# Patient Record
Sex: FEMALE | Race: ASIAN | NOT HISPANIC OR LATINO
[De-identification: names, ages, dates, MRNs, and addresses within clinical notes are randomized per-mention and may not be internally consistent; named-entity substitution may affect disease eponyms.]

---

## 2018-02-23 ENCOUNTER — RX ONLY (RX ONLY)
Age: 53
End: 2018-02-23

## 2018-02-23 ENCOUNTER — DOCTOR'S OFFICE (OUTPATIENT)
Dept: URBAN - NONMETROPOLITAN AREA CLINIC 1 | Facility: CLINIC | Age: 53
Setting detail: OPHTHALMOLOGY
End: 2018-02-23
Payer: COMMERCIAL

## 2018-02-23 DIAGNOSIS — H16.223: ICD-10-CM

## 2018-02-23 DIAGNOSIS — H10.13: ICD-10-CM

## 2018-02-23 DIAGNOSIS — H40.033: ICD-10-CM

## 2018-02-23 DIAGNOSIS — H16.222: ICD-10-CM

## 2018-02-23 PROCEDURE — 83861 MICROFLUID ANALY TEARS: CPT | Performed by: OPHTHALMOLOGY

## 2018-02-23 PROCEDURE — 92132 CPTRZD OPH DX IMG ANT SGM: CPT | Performed by: OPHTHALMOLOGY

## 2018-02-23 PROCEDURE — 92002 INTRM OPH EXAM NEW PATIENT: CPT | Performed by: OPHTHALMOLOGY

## 2018-02-23 ASSESSMENT — REFRACTION_MANIFEST
OS_VA2: 20/
OS_VA3: 20/
OD_VA2: 20/
OS_VA3: 20/
OS_VA1: 20/
OD_VA1: 20/
OS_VA3: 20/
OS_VA1: 20/
OU_VA: 20/
OD_VA1: 20/
OD_VA3: 20/
OD_VA3: 20/
OS_VA1: 20/
OS_VA2: 20/
OU_VA: 20/
OD_VA1: 20/
OD_VA2: 20/
OS_VA2: 20/
OU_VA: 20/
OD_VA3: 20/
OD_VA2: 20/

## 2018-02-23 ASSESSMENT — DRY EYES - PHYSICIAN NOTES
OD_GENERALCOMMENTS: KSICCA
OS_GENERALCOMMENTS: KSICCA

## 2018-02-23 ASSESSMENT — REFRACTION_CURRENTRX
OD_OVR_VA: 20/
OS_OVR_VA: 20/
OD_OVR_VA: 20/
OS_OVR_VA: 20/
OS_OVR_VA: 20/
OD_OVR_VA: 20/

## 2018-02-23 ASSESSMENT — CONFRONTATIONAL VISUAL FIELD TEST (CVF)
OS_FINDINGS: FULL
OD_FINDINGS: FULL

## 2018-02-23 ASSESSMENT — REFRACTION_AUTOREFRACTION
OD_CYLINDER: -0.75
OS_AXIS: 160
OD_SPHERE: +1.00
OS_SPHERE: +1.25
OD_AXIS: 9
OS_CYLINDER: -1.75

## 2018-02-23 ASSESSMENT — SPHEQUIV_DERIVED
OS_SPHEQUIV: 0.375
OD_SPHEQUIV: 0.625

## 2018-02-23 ASSESSMENT — VISUAL ACUITY
OD_BCVA: 20/100
OS_BCVA: 20/25

## 2019-04-05 ENCOUNTER — DOCTOR'S OFFICE (OUTPATIENT)
Dept: URBAN - NONMETROPOLITAN AREA CLINIC 1 | Facility: CLINIC | Age: 54
Setting detail: OPHTHALMOLOGY
End: 2019-04-05
Payer: COMMERCIAL

## 2019-04-05 DIAGNOSIS — H40.033: ICD-10-CM

## 2019-04-05 DIAGNOSIS — H11.062: ICD-10-CM

## 2019-04-05 PROCEDURE — 92012 INTRM OPH EXAM EST PATIENT: CPT | Performed by: OPHTHALMOLOGY

## 2019-04-05 ASSESSMENT — REFRACTION_MANIFEST
OD_VA2: 20/
OU_VA: 20/
OS_VA1: 20/
OS_VA3: 20/
OD_VA2: 20/
OD_VA3: 20/
OD_VA1: 20/
OS_VA1: 20/
OU_VA: 20/
OS_VA2: 20/
OS_VA3: 20/
OS_VA2: 20/
OD_VA3: 20/
OD_VA1: 20/

## 2019-04-05 ASSESSMENT — REFRACTION_AUTOREFRACTION
OD_SPHERE: +1.00
OS_AXIS: 160
OD_CYLINDER: -0.75
OS_SPHERE: +1.25
OD_AXIS: 9
OS_CYLINDER: -1.75

## 2019-04-05 ASSESSMENT — SPHEQUIV_DERIVED
OS_SPHEQUIV: 0.375
OD_SPHEQUIV: 0.625

## 2019-04-05 ASSESSMENT — REFRACTION_CURRENTRX
OS_OVR_VA: 20/
OD_OVR_VA: 20/

## 2019-04-05 ASSESSMENT — DRY EYES - PHYSICIAN NOTES
OS_GENERALCOMMENTS: KSICCA
OD_GENERALCOMMENTS: KSICCA

## 2019-04-05 ASSESSMENT — VISUAL ACUITY
OD_BCVA: 20/40-2
OS_BCVA: 20/25

## 2019-04-05 ASSESSMENT — CORNEAL PTERYGIUM: OS_PTERYGIUM: TEMPORAL 2MM

## 2019-04-25 ENCOUNTER — DOCTOR'S OFFICE (OUTPATIENT)
Dept: URBAN - NONMETROPOLITAN AREA CLINIC 1 | Facility: CLINIC | Age: 54
Setting detail: OPHTHALMOLOGY
End: 2019-04-25
Payer: COMMERCIAL

## 2019-04-25 DIAGNOSIS — H40.033: ICD-10-CM

## 2019-04-25 PROCEDURE — 92012 INTRM OPH EXAM EST PATIENT: CPT | Performed by: OPHTHALMOLOGY

## 2019-04-25 PROCEDURE — 92133 CPTRZD OPH DX IMG PST SGM ON: CPT | Performed by: OPHTHALMOLOGY

## 2019-04-25 ASSESSMENT — REFRACTION_MANIFEST
OD_VA2: 20/
OD_VA3: 20/
OD_VA1: 20/
OS_VA3: 20/
OD_VA1: 20/
OS_VA1: 20/
OD_VA3: 20/
OD_VA2: 20/
OS_VA2: 20/
OS_VA1: 20/
OS_VA3: 20/
OU_VA: 20/
OS_VA2: 20/
OU_VA: 20/

## 2019-04-25 ASSESSMENT — VISUAL ACUITY
OD_BCVA: 20/50-1
OS_BCVA: 20/25+2

## 2019-04-25 ASSESSMENT — REFRACTION_CURRENTRX
OS_OVR_VA: 20/
OD_OVR_VA: 20/
OD_OVR_VA: 20/
OS_OVR_VA: 20/
OS_OVR_VA: 20/
OD_OVR_VA: 20/

## 2019-04-25 ASSESSMENT — REFRACTION_AUTOREFRACTION
OS_CYLINDER: -1.75
OD_AXIS: 9
OS_SPHERE: +1.25
OD_CYLINDER: -0.75
OS_AXIS: 160
OD_SPHERE: +1.00

## 2019-04-25 ASSESSMENT — DRY EYES - PHYSICIAN NOTES
OS_GENERALCOMMENTS: KSICCA
OD_GENERALCOMMENTS: KSICCA

## 2019-04-25 ASSESSMENT — CONFRONTATIONAL VISUAL FIELD TEST (CVF)
OS_FINDINGS: FULL
OD_FINDINGS: FULL

## 2019-04-25 ASSESSMENT — SPHEQUIV_DERIVED
OD_SPHEQUIV: 0.625
OS_SPHEQUIV: 0.375

## 2019-04-25 ASSESSMENT — CORNEAL PTERYGIUM: OS_PTERYGIUM: TEMPORAL 2MM

## 2019-05-24 ENCOUNTER — AMBUL SURGICAL CARE (OUTPATIENT)
Dept: URBAN - METROPOLITAN AREA SURGERY 29 | Facility: SURGERY | Age: 54
Setting detail: OPHTHALMOLOGY
End: 2019-05-24
Payer: COMMERCIAL

## 2019-05-24 DIAGNOSIS — H40.032: ICD-10-CM

## 2019-05-24 PROCEDURE — 66761 REVISION OF IRIS: CPT | Performed by: OPHTHALMOLOGY

## 2019-05-24 PROCEDURE — G8907 PT DOC NO EVENTS ON DISCHARG: HCPCS | Performed by: OPHTHALMOLOGY

## 2019-05-24 PROCEDURE — G8918 PT W/O PREOP ORDER IV AB PRO: HCPCS | Performed by: OPHTHALMOLOGY

## 2019-06-28 ENCOUNTER — DOCTOR'S OFFICE (OUTPATIENT)
Dept: URBAN - NONMETROPOLITAN AREA CLINIC 1 | Facility: CLINIC | Age: 54
Setting detail: OPHTHALMOLOGY
End: 2019-06-28
Payer: COMMERCIAL

## 2019-06-28 DIAGNOSIS — H10.13: ICD-10-CM

## 2019-06-28 DIAGNOSIS — H11.042: ICD-10-CM

## 2019-06-28 DIAGNOSIS — H53.122: ICD-10-CM

## 2019-06-28 DIAGNOSIS — H40.031: ICD-10-CM

## 2019-06-28 DIAGNOSIS — H16.223: ICD-10-CM

## 2019-06-28 DIAGNOSIS — H43.392: ICD-10-CM

## 2019-06-28 DIAGNOSIS — H25.013: ICD-10-CM

## 2019-06-28 PROCEDURE — 92012 INTRM OPH EXAM EST PATIENT: CPT | Performed by: OPHTHALMOLOGY

## 2019-06-28 ASSESSMENT — REFRACTION_CURRENTRX
OS_OVR_VA: 20/
OS_OVR_VA: 20/
OD_OVR_VA: 20/
OD_OVR_VA: 20/
OS_OVR_VA: 20/
OD_OVR_VA: 20/

## 2019-06-28 ASSESSMENT — REFRACTION_MANIFEST
OU_VA: 20/
OS_VA2: 20/
OD_VA3: 20/
OS_VA2: 20/
OD_VA2: 20/
OS_VA3: 20/
OD_VA1: 20/
OU_VA: 20/
OD_VA1: 20/
OS_VA1: 20/
OD_VA3: 20/
OS_VA1: 20/
OD_VA2: 20/
OS_VA3: 20/

## 2019-06-28 ASSESSMENT — CORNEAL PTERYGIUM: OS_PTERYGIUM: TEMPORAL 2MM

## 2019-06-28 ASSESSMENT — VISUAL ACUITY
OS_BCVA: 20/25-2
OD_BCVA: 20/60+2

## 2019-06-28 ASSESSMENT — REFRACTION_AUTOREFRACTION
OS_SPHERE: +1.25
OD_CYLINDER: -0.75
OS_CYLINDER: -1.75
OS_AXIS: 160
OD_SPHERE: +1.00
OD_AXIS: 9

## 2019-06-28 ASSESSMENT — CONFRONTATIONAL VISUAL FIELD TEST (CVF)
OS_FINDINGS: FULL
OD_FINDINGS: FULL

## 2019-06-28 ASSESSMENT — DRY EYES - PHYSICIAN NOTES
OS_GENERALCOMMENTS: KSICCA
OD_GENERALCOMMENTS: KSICCA

## 2019-06-28 ASSESSMENT — SPHEQUIV_DERIVED
OS_SPHEQUIV: 0.375
OD_SPHEQUIV: 0.625

## 2019-07-11 ENCOUNTER — DOCTOR'S OFFICE (OUTPATIENT)
Dept: URBAN - NONMETROPOLITAN AREA CLINIC 1 | Facility: CLINIC | Age: 54
Setting detail: OPHTHALMOLOGY
End: 2019-07-11
Payer: COMMERCIAL

## 2019-07-11 DIAGNOSIS — H16.223: ICD-10-CM

## 2019-07-11 DIAGNOSIS — H53.122: ICD-10-CM

## 2019-07-11 DIAGNOSIS — H40.031: ICD-10-CM

## 2019-07-11 DIAGNOSIS — H10.13: ICD-10-CM

## 2019-07-11 PROBLEM — H43.392 VITREOUS DEBRIS; LEFT EYE: Status: ACTIVE | Noted: 2019-06-28

## 2019-07-11 PROBLEM — H16.221 KERATOCONJUNCTIVITIS SICCA; RIGHT EYE, LEFT EYE: Status: ACTIVE | Noted: 2018-02-23

## 2019-07-11 PROBLEM — H25.013: Status: ACTIVE | Noted: 2019-06-28

## 2019-07-11 PROBLEM — H16.222 KERATOCONJUNCTIVITIS SICCA; RIGHT EYE, LEFT EYE: Status: ACTIVE | Noted: 2018-02-23

## 2019-07-11 PROBLEM — H11.042 PTERYGIUM PERIPHERAL STATIONARY; LEFT EYE: Status: ACTIVE | Noted: 2019-06-28

## 2019-07-11 PROCEDURE — 92014 COMPRE OPH EXAM EST PT 1/>: CPT | Performed by: OPHTHALMOLOGY

## 2019-07-11 ASSESSMENT — REFRACTION_AUTOREFRACTION
OD_CYLINDER: -0.75
OS_SPHERE: +1.25
OD_AXIS: 9
OS_CYLINDER: -1.75
OS_AXIS: 160
OD_SPHERE: +1.00

## 2019-07-11 ASSESSMENT — REFRACTION_MANIFEST
OD_VA2: 20/
OD_VA2: 20/
OD_VA3: 20/
OU_VA: 20/
OS_VA1: 20/
OD_VA1: 20/
OD_VA3: 20/
OS_VA2: 20/
OS_VA1: 20/
OS_VA2: 20/
OS_VA3: 20/
OU_VA: 20/
OS_VA3: 20/
OD_VA1: 20/

## 2019-07-11 ASSESSMENT — VISUAL ACUITY
OD_BCVA: 20/25-1
OS_BCVA: 20/25+2

## 2019-07-11 ASSESSMENT — REFRACTION_CURRENTRX
OS_OVR_VA: 20/
OS_OVR_VA: 20/
OD_OVR_VA: 20/
OS_OVR_VA: 20/

## 2019-07-11 ASSESSMENT — DRY EYES - PHYSICIAN NOTES
OS_GENERALCOMMENTS: KSICCA
OD_GENERALCOMMENTS: KSICCA

## 2019-07-11 ASSESSMENT — SPHEQUIV_DERIVED
OD_SPHEQUIV: 0.625
OS_SPHEQUIV: 0.375

## 2019-07-11 ASSESSMENT — CONFRONTATIONAL VISUAL FIELD TEST (CVF)
OD_FINDINGS: FULL
OS_FINDINGS: FULL

## 2019-07-11 ASSESSMENT — CORNEAL PTERYGIUM: OS_PTERYGIUM: TEMPORAL 2MM

## 2020-07-13 ENCOUNTER — TRANSCRIBE ORDERS (OUTPATIENT)
Dept: ADMINISTRATIVE | Facility: HOSPITAL | Age: 55
End: 2020-07-13

## 2020-07-13 DIAGNOSIS — H57.10 PAIN AROUND EYE, UNSPECIFIED LATERALITY: ICD-10-CM

## 2020-07-13 DIAGNOSIS — H91.22 SUDDEN LEFT HEARING LOSS: Primary | ICD-10-CM

## 2021-01-05 ENCOUNTER — IMMUNIZATIONS (OUTPATIENT)
Dept: FAMILY MEDICINE CLINIC | Facility: HOSPITAL | Age: 56
End: 2021-01-05

## 2021-01-05 DIAGNOSIS — Z23 ENCOUNTER FOR IMMUNIZATION: ICD-10-CM

## 2021-01-05 PROCEDURE — 91301 SARS-COV-2 / COVID-19 MRNA VACCINE (MODERNA) 100 MCG: CPT | Performed by: PHARMACIST

## 2021-01-05 PROCEDURE — 0011A SARS-COV-2 / COVID-19 MRNA VACCINE (MODERNA) 100 MCG: CPT | Performed by: PHARMACIST

## 2021-02-08 ENCOUNTER — IMMUNIZATIONS (OUTPATIENT)
Dept: FAMILY MEDICINE CLINIC | Facility: HOSPITAL | Age: 56
End: 2021-02-08

## 2021-02-08 DIAGNOSIS — Z23 ENCOUNTER FOR IMMUNIZATION: Primary | ICD-10-CM

## 2021-02-08 PROCEDURE — 91301 SARS-COV-2 / COVID-19 MRNA VACCINE (MODERNA) 100 MCG: CPT

## 2021-02-08 PROCEDURE — 0012A SARS-COV-2 / COVID-19 MRNA VACCINE (MODERNA) 100 MCG: CPT

## 2021-12-03 ENCOUNTER — HOSPITAL ENCOUNTER (OUTPATIENT)
Dept: RADIOLOGY | Facility: CLINIC | Age: 56
Discharge: HOME | End: 2021-12-03
Payer: COMMERCIAL

## 2021-12-03 VITALS — BODY MASS INDEX: 20.83 KG/M2 | WEIGHT: 125 LBS | HEIGHT: 65 IN

## 2021-12-03 DIAGNOSIS — Z12.31 ENCOUNTER FOR SCREENING MAMMOGRAM FOR MALIGNANT NEOPLASM OF BREAST: ICD-10-CM

## 2021-12-03 PROCEDURE — 77067 SCR MAMMO BI INCL CAD: CPT

## 2021-12-03 PROCEDURE — 77063 BREAST TOMOSYNTHESIS BI: CPT

## 2021-12-09 DIAGNOSIS — Z12.31 ENCOUNTER FOR SCREENING MAMMOGRAM FOR MALIGNANT NEOPLASM OF BREAST: ICD-10-CM

## 2021-12-27 ENCOUNTER — OFFICE VISIT (OUTPATIENT)
Dept: GYNECOLOGY | Facility: CLINIC | Age: 56
End: 2021-12-27
Payer: COMMERCIAL

## 2021-12-27 VITALS
HEART RATE: 66 BPM | HEIGHT: 65 IN | SYSTOLIC BLOOD PRESSURE: 110 MMHG | WEIGHT: 124 LBS | DIASTOLIC BLOOD PRESSURE: 82 MMHG | BODY MASS INDEX: 20.66 KG/M2

## 2021-12-27 DIAGNOSIS — Z12.11 SCREENING FOR COLORECTAL CANCER: ICD-10-CM

## 2021-12-27 DIAGNOSIS — Z01.419 ENCNTR FOR GYN EXAM (GENERAL) (ROUTINE) W/O ABN FINDINGS: Primary | ICD-10-CM

## 2021-12-27 DIAGNOSIS — Z12.4 CERVICAL CANCER SCREENING: ICD-10-CM

## 2021-12-27 DIAGNOSIS — Z12.31 ENCOUNTER FOR SCREENING MAMMOGRAM FOR BREAST CANCER: ICD-10-CM

## 2021-12-27 DIAGNOSIS — Z12.12 SCREENING FOR COLORECTAL CANCER: ICD-10-CM

## 2021-12-27 PROCEDURE — S0610 ANNUAL GYNECOLOGICAL EXAMINA: HCPCS | Performed by: OBSTETRICS & GYNECOLOGY

## 2021-12-27 PROCEDURE — G0476 HPV COMBO ASSAY CA SCREEN: HCPCS | Performed by: OBSTETRICS & GYNECOLOGY

## 2021-12-27 PROCEDURE — G0145 SCR C/V CYTO,THINLAYER,RESCR: HCPCS | Performed by: OBSTETRICS & GYNECOLOGY

## 2021-12-29 LAB
HPV HR 12 DNA CVX QL NAA+PROBE: NEGATIVE
HPV16 DNA CVX QL NAA+PROBE: NEGATIVE
HPV18 DNA CVX QL NAA+PROBE: NEGATIVE

## 2022-01-07 LAB
LAB AP GYN PRIMARY INTERPRETATION: NORMAL
Lab: NORMAL

## 2024-10-12 ENCOUNTER — HOSPITAL ENCOUNTER (EMERGENCY)
Facility: HOSPITAL | Age: 59
Discharge: HOME | End: 2024-10-12
Attending: EMERGENCY MEDICINE
Payer: COMMERCIAL

## 2024-10-12 ENCOUNTER — APPOINTMENT (EMERGENCY)
Dept: RADIOLOGY | Facility: HOSPITAL | Age: 59
End: 2024-10-12
Payer: COMMERCIAL

## 2024-10-12 VITALS
RESPIRATION RATE: 18 BRPM | OXYGEN SATURATION: 100 % | SYSTOLIC BLOOD PRESSURE: 123 MMHG | TEMPERATURE: 96.7 F | HEIGHT: 65 IN | HEART RATE: 69 BPM | WEIGHT: 125 LBS | DIASTOLIC BLOOD PRESSURE: 70 MMHG | BODY MASS INDEX: 20.83 KG/M2

## 2024-10-12 DIAGNOSIS — M54.41 ACUTE RIGHT-SIDED LOW BACK PAIN WITH RIGHT-SIDED SCIATICA: Primary | ICD-10-CM

## 2024-10-12 PROCEDURE — 72100 X-RAY EXAM L-S SPINE 2/3 VWS: CPT

## 2024-10-12 PROCEDURE — 99283 EMERGENCY DEPT VISIT LOW MDM: CPT

## 2024-10-12 PROCEDURE — 99284 EMERGENCY DEPT VISIT MOD MDM: CPT | Performed by: EMERGENCY MEDICINE

## 2024-10-12 RX ORDER — GABAPENTIN 300 MG/1
300 CAPSULE ORAL 3 TIMES DAILY
Qty: 30 CAPSULE | Refills: 0 | Status: SHIPPED | OUTPATIENT
Start: 2024-10-12 | End: 2024-10-22

## 2024-10-12 RX ORDER — METHYLPREDNISOLONE 4 MG/1
TABLET ORAL
Qty: 21 TABLET | Refills: 0 | Status: SHIPPED | OUTPATIENT
Start: 2024-10-12

## 2024-10-12 NOTE — ED PROVIDER NOTES
Time reflects when diagnosis was documented in both MDM as applicable and the Disposition within this note       Time User Action Codes Description Comment    10/12/2024  6:10 PM Belinda Hernandez Add [M54.41] Acute right-sided low back pain with right-sided sciatica           ED Disposition       ED Disposition   Discharge    Condition   Stable    Date/Time   Sat Oct 12, 2024  6:10 PM    Comment   Tita Pimentel discharge to home/self care.                   Assessment & Plan       Medical Decision Making  Patient presents with low back pain most likely consistent with lumbar radiculopathy.  Differential diagnosis includes lumbago versus musculoskeletal spasm/sprain versus sciatica.  No back pain red flags on history or physical.  Presentation not consistent with malignancy, fracture, cauda equina syndrome, AAA, viscus perforation, osteomyelitis or epidural abscess, renal colic, pyelonephritis or other infectious process.  Given the clinical picture, no indication for further imaging at this time.      Problems Addressed:  Acute right-sided low back pain with right-sided sciatica: acute illness or injury    Amount and/or Complexity of Data Reviewed  Radiology: ordered and independent interpretation performed.    Risk  Prescription drug management.             Medications - No data to display    ED Risk Strat Scores                           SBIRT 22yo+      Flowsheet Row Most Recent Value   Initial Alcohol Screen: US AUDIT-C     1. How often do you have a drink containing alcohol? 0 Filed at: 10/12/2024 1803   2. How many drinks containing alcohol do you have on a typical day you are drinking?  0 Filed at: 10/12/2024 1803   3b. FEMALE Any Age, or MALE 65+: How often do you have 4 or more drinks on one occassion? 0 Filed at: 10/12/2024 1803   Audit-C Score 0 Filed at: 10/12/2024 1803   BELLE: How many times in the past year have you...    Used an illegal drug or used a prescription medication for non-medical reasons? Never  Filed at: 10/12/2024 1805                            History of Present Illness       Chief Complaint   Patient presents with    Back Pain     Pt presented to this ED from home c/o worsening right lower back pain since lifting heavy box 2wks ago. Pt taking ibuprofen and tylenol w/o relief.        History reviewed. No pertinent past medical history.   Past Surgical History:   Procedure Laterality Date     SECTION        Family History   Problem Relation Age of Onset    No Known Problems Mother     No Known Problems Father     No Known Problems Maternal Grandmother     No Known Problems Maternal Grandfather     No Known Problems Paternal Grandmother     No Known Problems Paternal Grandfather     No Known Problems Maternal Aunt     No Known Problems Maternal Aunt     No Known Problems Maternal Aunt     No Known Problems Paternal Aunt       Social History     Tobacco Use    Smoking status: Never    Smokeless tobacco: Never   Substance Use Topics    Alcohol use: Yes    Drug use: Never      E-Cigarette/Vaping      E-Cigarette/Vaping Substances      I have reviewed and agree with the history as documented.     Patient is a 59-year-old female presenting to the emergency department complaining of low back pain radiating down the right leg that is been going on for the past 2 weeks, states it started after she was using a hand truck to move something heavy, she instantly felt pain, she has been trying to stretch at home and taking OTC anti-inflammatories but pain has not gotten any better, in fact worsened over the past 2 days, no new injury, no fever or chills, no numbness or tingling in her groin area, no bowel or bladder dysfunction, no dysuria or hematuria, no history of similar symptoms previously        Review of Systems   Constitutional: Negative.    HENT: Negative.     Eyes: Negative.    Respiratory: Negative.     Cardiovascular: Negative.    Gastrointestinal: Negative.    Endocrine: Negative.     Genitourinary: Negative.    Musculoskeletal:  Positive for back pain.   Skin: Negative.    Allergic/Immunologic: Negative.    Neurological: Negative.    Hematological: Negative.    Psychiatric/Behavioral: Negative.             Objective       ED Triage Vitals [10/12/24 1727]   Temperature Pulse Blood Pressure Respirations SpO2 Patient Position - Orthostatic VS   (!) 96.7 °F (35.9 °C) 69 123/70 18 100 % Sitting      Temp src Heart Rate Source BP Location FiO2 (%) Pain Score    -- Monitor Left arm -- 10 - Worst Possible Pain      Vitals      Date and Time Temp Pulse SpO2 Resp BP Pain Score FACES Pain Rating User   10/12/24 1727 96.7 °F (35.9 °C) 69 100 % 18 123/70 10 - Worst Possible Pain upon movement -- AC            Physical Exam  Constitutional:       Appearance: She is well-developed.   HENT:      Head: Normocephalic and atraumatic.   Eyes:      Conjunctiva/sclera: Conjunctivae normal.      Pupils: Pupils are equal, round, and reactive to light.   Cardiovascular:      Rate and Rhythm: Normal rate.   Pulmonary:      Effort: Pulmonary effort is normal.   Abdominal:      Palpations: Abdomen is soft.   Musculoskeletal:         General: Normal range of motion.      Cervical back: Normal range of motion and neck supple.        Back:       Comments: Tenderness to palpate in the lumbar paraspinal musculature into the right buttocks, positive pain with straight leg raise   Skin:     General: Skin is warm and dry.   Neurological:      Mental Status: She is alert and oriented to person, place, and time.         Results Reviewed       None            XR spine lumbar 2 or 3 views injury   ED Interpretation by Belinda Hernandez DO (10/12 1811)   No acute findings          Procedures    ED Medication and Procedure Management   None     Patient's Medications   Discharge Prescriptions    GABAPENTIN (NEURONTIN) 300 MG CAPSULE    Take 1 capsule (300 mg total) by mouth 3 (three) times a day for 10 days For post-herpetic neuralgia:  Take 1 tablet on day 1,  Then take 2 tablets on day 2, Then take 3 tablets on day 3 and every day after that as instructed by your doctor.       Start Date: 10/12/2024End Date: 10/22/2024       Order Dose: 300 mg       Quantity: 30 capsule    Refills: 0    METHYLPREDNISOLONE 4 MG TABLET THERAPY PACK    Use as directed on package       Start Date: 10/12/2024End Date: --       Order Dose: --       Quantity: 21 tablet    Refills: 0       ED SEPSIS DOCUMENTATION   Time reflects when diagnosis was documented in both MDM as applicable and the Disposition within this note       Time User Action Codes Description Comment    10/12/2024  6:10 PM Belinda Hernandez Add [M54.41] Acute right-sided low back pain with right-sided sciatica                  Belinda Hernandez DO  10/12/24 1816